# Patient Record
Sex: FEMALE | URBAN - METROPOLITAN AREA
[De-identification: names, ages, dates, MRNs, and addresses within clinical notes are randomized per-mention and may not be internally consistent; named-entity substitution may affect disease eponyms.]

---
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
## 2023-03-17 ENCOUNTER — TRANSFERRED RECORDS (OUTPATIENT)
Dept: HEALTH INFORMATION MANAGEMENT | Facility: CLINIC | Age: 18
End: 2023-03-17

## 2023-03-30 ENCOUNTER — TRANSFERRED RECORDS (OUTPATIENT)
Dept: HEALTH INFORMATION MANAGEMENT | Facility: CLINIC | Age: 18
End: 2023-03-30

## 2023-04-26 ENCOUNTER — DOCUMENTATION ONLY (OUTPATIENT)
Dept: ORTHOPEDICS | Facility: CLINIC | Age: 18
End: 2023-04-26

## 2023-04-26 ENCOUNTER — TELEPHONE (OUTPATIENT)
Dept: ORTHOPEDICS | Facility: CLINIC | Age: 18
End: 2023-04-26

## 2023-04-26 NOTE — PROGRESS NOTES
"Patient being referred for a 3rd opinion.  2nd opinion at [facility] in [place], [place] with Dr. Potter \"Eva\" Popeye.  1st opinion at [facility] with Dr. Rickie Fernandez.  Patient had a previous MPFL Reconstruction  done 03/23/2021 at [facility] in [place], [place] by Dr. Marv Aguiar.    GUILLERMO was sent to the patient's email:  [email or web address]  "

## 2023-04-26 NOTE — TELEPHONE ENCOUNTER
[facility] Call Center    Phone Message    May a detailed message be left on voicemail: yes     Reason for Call: Other: Emeli Grigsby is returning call that was made today. Thank you, Jonna     Action Taken: Other: CSC    Travel Screening: Not Applicable

## 2023-05-08 ENCOUNTER — TRANSFERRED RECORDS (OUTPATIENT)
Dept: HEALTH INFORMATION MANAGEMENT | Facility: CLINIC | Age: 18
End: 2023-05-08

## 2023-07-21 ENCOUNTER — TELEPHONE (OUTPATIENT)
Dept: ORTHOPEDICS | Facility: CLINIC | Age: 18
End: 2023-07-21

## 2023-07-21 NOTE — TELEPHONE ENCOUNTER
[facility] Call Center    Phone Message    May a detailed message be left on voicemail: yes     Reason for Call: Other: pt asking for call back re: update on her case and to be scheduled with Dr. Emily maldonado.  Right knee - referral from Dr. Nye from [facility] [place].  Records were sent and pt has not heard back. Pt asking to be reached at [phone]     Action Taken: Other: Dr. Emily Maldonado     Travel Screening: Not Applicable

## 2023-08-16 NOTE — TELEPHONE ENCOUNTER
Patient was called back.  She stated that she can mail the imaging and any records that she has.  She was told that once we get the records Dr. Maldonado will review and she will be called back with her recommendations.  She was given the address and was thankful for the call back.

## 2023-12-20 ENCOUNTER — TELEPHONE (OUTPATIENT)
Dept: ORTHOPEDICS | Facility: CLINIC | Age: 18
End: 2023-12-20

## 2023-12-20 NOTE — TELEPHONE ENCOUNTER
Patient was called back.  Clinic has received her records and Dr. Maldonado has done an initial review.  Dr. Maldonado will call her sometime after the year to discuss her knee and any options she recommends. She was thankful for the call back.

## 2024-02-16 ENCOUNTER — TELEPHONE (OUTPATIENT)
Dept: ORTHOPEDICS | Facility: CLINIC | Age: 19
End: 2024-02-16

## 2024-02-16 NOTE — TELEPHONE ENCOUNTER
Patient was called back and a message was left.      Dr. Maldonado reviewed her images and records and believes that her symptoms point to more of a cartilage issue.  She does not see specifically for cartilage but did recommend that she see Dr. Jourdan Myers at [facility] in [place].  This information was left on the message.  If she would like to see Dr Ascencior she can schedule an in person visit in the next available new patient time slot but she would more than likely refer her phone to one of her partners Dr. Froilan Moss.  Our number was left.

## 2024-02-16 NOTE — TELEPHONE ENCOUNTER
Health Call Center    Phone Message    May a detailed message be left on voicemail: yes     Reason for Call: Other: Patient is calling to speak with [facility] or Dr Maldonado directly. Patient would like to have video appointment since [place] of [place], and is not able to fly in for evaluation. Patient would like to have video appointment discussion and fly in for surgery when needed. Patient also would like information in voicemail on 2/16 emailed to her at [email or web address].      Action Taken: Other: [id number]    Travel Screening: Not Applicable

## 2024-02-19 NOTE — TELEPHONE ENCOUNTER
Patient was called back and her questions were answered.  She was told that Dr. Maldonado cannot do a video visit. She was understanding and her questions on why Dr. Maldonado didn't think she needed a trochelopalsty was explained.  She feels that her issue may be related more to the cartilage then to the bone and she does not do any cartilage restoration surgeries.  This is also only based on the images and records that Dr. Maldonado was able to review.  If Dr. Jourdan Myers at [facility] thinks that she does need a trochleoplasty she stated that she is happy to come and see Dr. Maldonado for a consultation.    Dr. Maldonado is also happy to see her.  She was Thankful for the callback and the information on Dr. Myers will be emailed to her.